# Patient Record
Sex: FEMALE | Race: WHITE | NOT HISPANIC OR LATINO | Employment: OTHER | URBAN - NONMETROPOLITAN AREA
[De-identification: names, ages, dates, MRNs, and addresses within clinical notes are randomized per-mention and may not be internally consistent; named-entity substitution may affect disease eponyms.]

---

## 2019-01-18 ENCOUNTER — OCCUPATIONAL MEDICINE (OUTPATIENT)
Dept: URGENT CARE | Facility: PHYSICIAN GROUP | Age: 54
End: 2019-01-18
Payer: COMMERCIAL

## 2019-01-18 VITALS
SYSTOLIC BLOOD PRESSURE: 140 MMHG | RESPIRATION RATE: 14 BRPM | BODY MASS INDEX: 25.46 KG/M2 | WEIGHT: 168 LBS | HEIGHT: 68 IN | OXYGEN SATURATION: 97 % | DIASTOLIC BLOOD PRESSURE: 98 MMHG | TEMPERATURE: 99.2 F | HEART RATE: 80 BPM

## 2019-01-18 DIAGNOSIS — M75.42 IMPINGEMENT SYNDROME OF LEFT SHOULDER: ICD-10-CM

## 2019-01-18 DIAGNOSIS — S46.812A STRAIN OF LEFT TRAPEZIUS MUSCLE, INITIAL ENCOUNTER: ICD-10-CM

## 2019-01-18 PROCEDURE — 99214 OFFICE O/P EST MOD 30 MIN: CPT | Performed by: PHYSICIAN ASSISTANT

## 2019-01-18 RX ORDER — PREDNISONE 10 MG/1
TABLET ORAL
Qty: 30 TAB | Refills: 0 | Status: SHIPPED | OUTPATIENT
Start: 2019-01-18 | End: 2019-01-28

## 2019-01-18 RX ORDER — CYCLOBENZAPRINE HCL 10 MG
10 TABLET ORAL 3 TIMES DAILY PRN
Qty: 30 TAB | Refills: 0 | Status: SHIPPED | OUTPATIENT
Start: 2019-01-18 | End: 2019-02-01 | Stop reason: SDUPTHER

## 2019-01-18 NOTE — PROGRESS NOTES
Chief Complaint   Patient presents with   • Shoulder Injury       HISTORY OF PRESENT ILLNESS: Patient is a 53 y.o. female who presents today because she is here for work comp injury.    Date of injury 1/15/2019 at approximately 10 AM.    She is a , a large husky dog jumped and fell on her left shoulder with her hand caught on a bathtub creating a downward pulling sensation to her left shoulder area.  Ever since then she has had intermittent numbness and tingling in her left hand, some mottling of her left hand.  She has been using ibuprofen and a TENS unit without improvement.  She has pain in her trapezius, the left side of her neck and her shoulder blade area.    She does have a history of crushing injury to that arm approximately 12 years ago from a different workers comp incident, but was not having symptoms at the time of this injury.    Patient Active Problem List    Diagnosis Date Noted   • Small bowel obstruction ([facility]) 05/01/2010   • Acute renal failure (ARF) ([facility]) 04/29/2010   • CAP (community acquired pneumonia) 04/12/2010   • Ventilator dependent ([facility]) 04/12/2010   • Renal mass, left 04/07/2010   • Respiratory failure, acute ([facility]) 04/04/2010   • Pleural effusion 04/04/2010       Allergies:Cortisone; Neomycin-polymyxin b; Pcn [penicillins]; and Triple antibiotic [neomycin-bacitracin-polymyxin]    Current Outpatient Prescriptions Or[facility] in [facility]   Medication Sig Dispense Refill   • cyclobenzaprine (FLEXERIL) 10 MG Tab Take 1 Tab by mouth 3 times a day as needed. 30 Tab 0   • predniSONE (DELTASONE) 10 MG Tab 4 PO QD X 4 day, 3 PO QD x 3 days, 2 PO QD x 2 days, 1 PO QD x 1 day 30 Tab 0   • hydrocodone-acetaminophen (NORCO) 5-325 MG Tab per tablet Take 1-2 Tabs by mouth every 6 hours as needed. 12 Tab 0   • cyclobenzaprine (FLEXERIL) 10 MG Tab Take 1 Tab by mouth 3 times a day as needed. 15 Tab 0   • ibuprofen (MOTRIN) 800 MG Tab Take 1 Tab by mouth every 8 hours as needed. 30 Tab 3     No current  "Epic-ordered facility-administered medications on file.        Past Medical History:   Diagnosis Date   • Arthritis     hands   • Backpain    • Bronchitis    • Indigestion     GERD   • Pneumonia        Social History   Substance Use Topics   • Smoking status: Former Smoker   • Smokeless tobacco: Never Used      Comment: 20yr pk hx   • Alcohol use No       No family status information on file.   No family history on file.    ROS:  Review of Systems   Constitutional: Negative for fever, chills, weight loss and malaise/fatigue.   HENT: Negative for ear pain, nosebleeds, congestion, sore throat and neck pain.    Eyes: Negative for blurred vision.   Respiratory: Negative for cough, sputum production, shortness of breath and wheezing.    Cardiovascular: Negative for chest pain, palpitations, orthopnea and leg swelling.   Gastrointestinal: Negative for heartburn, nausea, vomiting and abdominal pain.   Genitourinary: Negative for dysuria, urgency and frequency.     Exam:  Blood pressure 140/98, pulse 80, temperature 37.3 °C (99.2 °F), temperature source Temporal, resp. rate 14, height 1.727 m (5' 8\"), weight 76.2 kg (168 lb), SpO2 97 %.  General:  Well nourished, well developed female in NAD  Head:Normocephalic, atraumatic  Eyes: PERRLA, EOM within normal limits, no conjunctival injection, no scleral icterus, visual fields and acuity grossly intact.  Extremities: no clubbing, cyanosis, or edema.  Musculoskeletal: Left arm is without any visual deformity, erythema, edema or ecchymosis.  She has good distal circulation and sensation at this time, good distal  strength.  She has tenderness to palpation of the trapezius, the parascapular muscles and the lateral aspect of the shoulder joint.  She has reduced range of motion in all planes secondary to pain.    Please note that this dictation was created using voice recognition software. I have made every reasonable attempt to correct obvious errors, but I expect that there " are errors of grammar and possibly content that I did not discover before finalizing the note.    Assessment/Plan:  1. Impingement syndrome of left shoulder  predniSONE (DELTASONE) 10 MG Tab   2. Strain of left trapezius muscle, initial encounter  cyclobenzaprine (FLEXERIL) 10 MG Tab       Patient has failed over-the-counter anti-inflammatories and TENS unit.  Having nerve impingement symptoms, medications as above, lifting restrictions of 10 pounds and follow-up in 1 week

## 2019-01-18 NOTE — LETTER
"EMPLOYEE’S CLAIM FOR COMPENSATION/ REPORT OF INITIAL TREATMENT  FORM C-4    EMPLOYEE’S CLAIM - PROVIDE ALL INFORMATION REQUESTED   First Name  Leila Last Name  Berto Birthdate                    1965                Sex  female Claim Number   Home Address  [address] Age  53 y.o. Height  1.727 m (5' 8\") Weight  76.2 kg (168 lb) [facility]     [facility] [place]  [address] Telephone  [phone] (home)    Mailing Address  [address] [facility] [place]  [address] Primary Language Spoken  English    Insurer   Third Party   Miscellaneous   Employee's Occupation (Job Title) When Injury or Occupational Disease Occurred      Employer's Name    [employer]   [phone]   Employer Address   [address] 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 (Be specific an answer in detail. Use additional sheet if necessary)  Drying big dog, dog jumped onto my shoulder trapped my arm between tub and dog.   If you believe that you have an occupational disease, when did you first have knowledge of the disability and it relationship to your employment?  NA Witnesses to the Accident  Richelle Paulino      Nature of Injury or Occupational Disease  Workers' Compensation  Part(s) of Body Injured or Affected  Shoulder (L), ,     I certify that the above is true and correct to the best of my knowledge and that I have provided this information in order to obtain the benefits of [place]’s " [place]  I hereby authorize any physician, chiropractor, surgeon, practitioner, or other person, any hospital, including [facility] or [facility], any medical service organization, any insurance company, or other institution or organization to release to each other, any medical or other information, including benefits paid or payable, pertinent to this injury or disease, except information relative to diagnosis, treatment and/or counseling for AIDS, psychological conditions, alcohol or controlled substances, for which I must give specific authorization.  A Photostat of this authorization shall be as valid as the original.     Date 01/18/2019   [facility]   Employee’s Signature   THIS REPORT MUST BE COMPLETED AND MAILED WITHIN 3 WORKING DAYS OF TREATMENT   [facility]  Name of [facility]   Date  1/18/2019 Diagnosis  (M75.42) Impingement syndrome of left shoulder  (S46.812A) Strain of left trapezius muscle, initial encounter Is there evidence the injured employee was under the influence of alcohol and/or another controlled substance at the time of accident?   Hour  2:39 PM Description of Injury or Disease  Diagnoses of Impingement syndrome of left shoulder and Strain of left trapezius muscle, initial encounter were pertinent to this visit. No   Treatment  Steroid, muscle relaxant, apply heat, lifting restrictions  Have you advised the patient to remain off work five days or more? No   X-Ray Findings      If Yes   From Date  To Date      From information given by the employee, together with medical evidence, can you directly connect this injury or occupational disease as job incurred?  Yes If No Full Duty  No Modified Duty  Yes   Is additional medical care by a physician indicated?  Yes  Comments:Follow-up in 1 week If Modified Duty, Specify any Limitations /  "Restrictions  No lifting more than 10 pounds with left arm   Do you know of any previous injury or disease contributing to this condition or occupational disease?                            Yes  Comments:Previous injury 12 years ago, had resolution of symptoms prior to this   Date  1/18/2019 Print Doctor’s Name Cami Kaiser P.A.-C. I certify the employer’s copy of  this form was mailed on:   Address  [address] Insurer’s Use Only     [place]  [address]    Provider’s Tax ID Number  [id number] Telephone  Dept: [phone]        e-SignSTACAMI MEJIA P.A.-C.   e-Signature: Dr. Kaveh Ji, Medical Director Degree           ORIGINAL-TREATING PHYSICIAN OR CHIROPRACTOR    PAGE 2-INSURER/TPA    PAGE 3-EMPLOYER    PAGE 4-EMPLOYEE             Form C-4 (rev10/07)              BRIEF DESCRIPTION OF RIGHTS AND BENEFITS  (Pursuant to [place]RS 616C.050)    Notice of Injury or Occupational Disease (Incident Report Form C-1): If an injury or occupational disease (OD) arises out of and in the  course of employment, you must provide written notice to your employer as soon as practicable, but no later than 7 days after the accident or  OD. Your employer shall maintain a sufficient supply of the required forms.    Claim for Compensation (Form C-4): If medical treatment is sought, the form C-4 is available at the place of initial treatment. A completed  \"Claim for Compensation\" (Form C-4) must be filed within 90 days after an accident or OD. The treating physician or chiropractor must,  within 3 working days after treatment, complete and mail to the employer, the employer's insurer and third-party , the Claim for  Compensation.    Medical Treatment: If you require medical treatment for your on-the-job injury or OD, you may be required to select a physician or  chiropractor from a list provided by your workers’ compensation insurer, if it has contracted with an Organization for Managed Care " (MCO) or  Preferred Provider Organization (PPO) or providers of health care. If your employer has not entered into a contract with an MCO or PPO, you  may select a physician or chiropractor from the Panel of Physicians and Chiropractors. Any medical costs related to your industrial injury or  OD will be paid by your insurer.    Temporary Total Disability (TTD): If your doctor has certified that you are unable to work for a period of at least 5 consecutive days, or 5  cumulative days in a 20-day period, or places restrictions on you that your employer does not accommodate, you may be entitled to TTD  compensation.    Temporary Partial Disability (TPD): If the wage you receive upon reemployment is less than the compensation for TTD to which you are  entitled, the insurer may be required to pay you TPD compensation to make up the difference. TPD can only be paid for a maximum of 24  months.    Permanent Partial Disability (PPD): When your medical condition is stable and there is an indication of a PPD as a result of your injury or  OD, within 30 days, your insurer must arrange for an evaluation by a rating physician or chiropractor to determine the degree of your PPD. The  amount of your PPD award depends on the date of injury, the results of the PPD evaluation and your age and wage.    Permanent Total Disability (PTD): If you are medically certified by a treating physician or chiropractor as permanently and totally disabled  and have been granted a PTD status by your insurer, you are entitled to receive monthly benefits not to exceed 66 2/3% of your average  monthly wage. The amount of your PTD payments is subject to reduction if you previously received a PPD award.    Vocational Rehabilitation Services: You may be eligible for vocational rehabilitation services if you are unable to return to the job due to a  permanent physical impairment or permanent restrictions as a result of your injury or occupational  disease.    Transportation and Per Tal Reimbursement: You may be eligible for travel expenses and per tal associated with medical treatment.    Reopening: You may be able to reopen your claim if your condition worsens after claim closure.    Appeal Process: If you disagree with a written determination issued by the insurer or the insurer does not respond to your request, you may  appeal to the Department of Administration, , by following the instructions contained in your determination letter. You must  appeal the determination within 70 days from the date of the determination letter at [address], [address], [place], [place]  [address], or [address], [address], [place], [place] [address]. If you disagree with the  decision, you may appeal to the  Department of Administration, . You must file your appeal within 30 days from the date of the  decision  letter at [address], [address], [place], [place] [address], or [address], [place], [place] [address]. If you  disagree with a decision of an , you may file a petition for judicial review with the District Court. You must do so within 30  days of the Appeal Officer’s decision. You may be represented by an  at your own expense or you may contact the [facility] for possible  representation.    [place]  for Injured Workers (NAIW): If you disagree with a  decision, you may request that NAIW represent you  without charge at an  Hearing. For information regarding denial of benefits, you may contact the [facility] at: [address], [address], [place] [address], [phone], or [address] [address], [phone]    To File a Complaint with the Division: If you wish to file a complaint with the  of the Division of Industrial Relations (DIR),  please contact  the Workers’ Compensation Section, [address], [address], [place], [place] [address], telephone [phone]) [phone], or  [address], [address], [place], [place] [address], telephone [phone].    For assistance with Workers’ Compensation Issues: you may contact the Office of the [facility] Consumer Health Assistance, [address] [place] [address], [address], [place], [place] [address], Toll Free 1-981.933.4122, Web site: http://[place].[facility].[email or web address]., E-mail  [email or web address].                                                                                                                                                                                                                                   __________________________________________________________________                                                                   __01/18/2019________                Employee Name / Signature                                                                                                                                                       Date                                                                                                                                                                                                     D-2 (rev. 10/07)

## 2019-01-18 NOTE — LETTER
[facility]  [facility] [place] [address]  Phone:  [phone] - Fax:  [phone]   [facility] Progress Report and Disability Certification  Date of Service: 1/18/2019   No Show:  No  Date / Time of Next Visit: 1/25/2019   Claim Information   Patient Name: Leila Thomson  Claim Number:     Employer:   [employer] Date of Injury: 1/15/2019     Insurer / TPA: Miscellaneous  ID / SSN:     Occupation:   Diagnosis: Diagnoses of Impingement syndrome of left shoulder and Strain of left trapezius muscle, initial encounter were pertinent to this visit.    Medical Information   Related to Industrial Injury? Yes    Subjective Complaints:  Left shoulder pain, left trapezius pain, left arm numbness and tingling and radiating pain after injury at work 3 days ago   Objective Findings: Musculoskeletal: Left arm is without any visual deformity, erythema, edema or ecchymosis.  She has good distal circulation and sensation at this time, good distal  strength.  She has tenderness to palpation of the trapezius, the parascapular muscles and the lateral aspect of the shoulder joint.  She has reduced range of motion in all planes secondary to pain.   Pre-Existing Condition(s):     Assessment:   Initial Visit    Status: Additional Care Required  Comments:Follow-up in 1 week  Permanent Disability:No    Plan: MedicationMedication (NOT at Work)  Comments:Do not take Flexeril during working hours    Diagnostics:      Comments:       Disability Information   Status: Released to Restricted Duty    From:  1/18/2019  Through: 1/25/2019 Restrictions are: Temporary   Physical Restrictions   Sitting:    Standing:    Stooping:    Bending:      Squatting:    Walking:    Climbing:    Pushing:    Comments:No more than 10 pounds of left arm   Pulling:    Comments:No more than 10 pounds with left arm Other:    Reaching Above Shoulder (L): 0 hrs/day Reaching Above Shoulder (R):          Reaching Below Shoulder (L):  0 hrs/day Reaching Below Shoulder (R):      Not to exceed Weight Limits   Carrying(hrs):   Weight Limit(lb): < or = to 10 pounds  Comments:With left arm Lifting(hrs):   Weight  Limit(lb): < or = to 10 pounds  Comments:With left arm   Comments:      Repetitive Actions   Hands: i.e. Fine Manipulations from Grasping:     Feet: i.e. Operating Foot Controls:     Driving / Operate Machinery:     Physician Name: Cami Kaiser P.A.-C. Physician Signature: CAMI Izquierdo P.A.-C. e-Signature: Dr. Kaveh Ji, Medical Director   Clinic Name / Location: [address] [address] Clinic Phone Number: Dept: [phone]   Appointment Time: 2:30 Pm Visit Start Time: 2:39 PM   Check-In Time:  2:36 Pm Visit Discharge Time:  3:19 PM   Original-Treating Physician or Chiropractor    Page 2-Insurer/TPA    Page 3-Employer    Page 4-Employee

## 2019-01-25 ENCOUNTER — OCCUPATIONAL MEDICINE (OUTPATIENT)
Dept: URGENT CARE | Facility: PHYSICIAN GROUP | Age: 54
End: 2019-01-25
Payer: COMMERCIAL

## 2019-01-25 VITALS
SYSTOLIC BLOOD PRESSURE: 132 MMHG | TEMPERATURE: 99.4 F | HEART RATE: 80 BPM | RESPIRATION RATE: 14 BRPM | DIASTOLIC BLOOD PRESSURE: 94 MMHG | WEIGHT: 168 LBS | HEIGHT: 68 IN | BODY MASS INDEX: 25.46 KG/M2 | OXYGEN SATURATION: 97 %

## 2019-01-25 DIAGNOSIS — M75.42 IMPINGEMENT SYNDROME OF LEFT SHOULDER: ICD-10-CM

## 2019-01-25 DIAGNOSIS — S46.812D STRAIN OF LEFT TRAPEZIUS MUSCLE, SUBSEQUENT ENCOUNTER: ICD-10-CM

## 2019-01-25 PROCEDURE — 99213 OFFICE O/P EST LOW 20 MIN: CPT | Performed by: NURSE PRACTITIONER

## 2019-01-25 NOTE — LETTER
[facility]  [facility] [place] [address]  Phone:  [phone] - Fax:  [phone]   [facility] Progress Report and Disability Certification  Date of Service: 1/25/2019   No Show:  No  Date / Time of Next Visit: 2/1/2019   Claim Information   Patient Name: Leila Paulino  Claim Number:     Employer:   [employer] Date of Injury: 1/15/2019     Insurer / TPA:   Miscellaneous  ID / SSN:     Occupation:   Diagnosis: Diagnoses of Impingement syndrome of left shoulder and Strain of left trapezius muscle, subsequent encounter were pertinent to this visit.    Medical Information   Related to Industrial Injury? Yes    Subjective Complaints:  Date of injury 1/15/2019:     She is a , a large husky dog jumped and fell on her left shoulder with her hand caught on a bathtub creating a downward pulling sensation to her left shoulder area.  Ever since then she has had intermittent numbness and tingling in her left hand. She has pain in her trapezius, the left side of her neck and her shoulder blade area. She has been using ibuprofen during day and flexeril at night with about 30% improvement in pain since DOI. Admits work restrictions are helpful.      She does have a history of crushing injury to that arm approximately 12 years ago from a different workers comp incident, but was not having symptoms at the time of this injury.   Objective Findings: Musculoskeletal, left upper arm: no visual deformity, erythema, edema or ecchymosis.  Distal CMS and sensation intact at this time, good distal  strength.  There is still tenderness to palpation of the trapezius, the parascapular muscles and the lateral aspect of the shoulder joint.  Reports improved ROM since last visit.    Pre-Existing Condition(s):     Assessment:   Condition Improved    Status: Additional Care Required  Permanent Disability:No    Plan:      Diagnostics:      Comments:  Continue  current restrictions  Alternate ibuprofen and tylenol during day for pain  Flexeril at night  FV in one week    Disability Information   Status: Released to Restricted Duty    From:  2019  Through: 2019 Restrictions are:     Physical Restrictions   Sitting:    Standing:    Stooping:    Bending:      Squatting:    Walking:    Climbing:    Pushin hrs/day   Pullin hrs/day Other:    Reaching Above Shoulder (L): 0 hrs/day Reaching Above Shoulder (R):       Reaching Below Shoulder (L):  0 hrs/day Reaching Below Shoulder (R):      Not to exceed Weight Limits   Carrying(hrs):   Weight Limit(lb): < or = to 10 pounds  Comments:left arm Lifting(hrs):   Weight  Limit(lb): < or = to 10 pounds  Comments:left arm   Comments:      Repetitive Actions   Hands: i.e. Fine Manipulations from Grasping:     Feet: i.e. Operating Foot Controls:     Driving / Operate Machinery:     Physician Name: ALONDRA Dennis Physician Signature: LINUS Dewitt e-Signature: Dr. Kaveh Ji, Medical Director   Clinic Name / Location: [address] [address] Clinic Phone Number: Dept: [phone]   Appointment Time: 3:00 Pm Visit Start Time: 2:59 PM   Check-In Time:  2:58 Pm Visit Discharge Time:  3:23pm   Original-Treating Physician or Chiropractor    Page 2-Insurer/TPA    Page 3-Employer    Page 4-Employee

## 2019-01-25 NOTE — PROGRESS NOTES
"Subjective:      Leila Paulino is a 53 y.o. female who presents with Follow-Up      Date of injury 1/15/2019:     She is a , a large husky dog jumped and fell on her left shoulder with her hand caught on a bathtub creating a downward pulling sensation to her left shoulder area.  Ever since then she has had intermittent numbness and tingling in her left hand. She has pain in her trapezius, the left side of her neck and her shoulder blade area. She has been using ibuprofen during day and flexeril at night with about 30% improvement in pain since DOI. Admits work restrictions are helpful.      She does have a history of crushing injury to that arm approximately 12 years ago from a different workers comp incident, but was not having symptoms at the time of this injury.     HPI    Review of Systems   Musculoskeletal: Positive for joint pain (left shoulder).   All other systems reviewed and are negative.    Past Medical History:   Diagnosis Date   • Arthritis     hands   • Backpain    • Bronchitis    • Indigestion     GERD   • Pneumonia       Past Surgical History:   Procedure Laterality Date   • OTHER ABDOMINAL SURGERY        Social History     Social History   • Marital status:      Spouse name: N/A   • Number of children: N/A   • Years of education: N/A     Occupational History   • Not on file.     Social History Main Topics   • Smoking status: Former Smoker   • Smokeless tobacco: Never Used      Comment: 20yr pk hx   • Alcohol use No   • Drug use: No   • Sexual activity: Not on file     Other Topics Concern   • Not on file     Social History Narrative   • No narrative on file          Objective:     /94 (BP Location: Right arm, Patient Position: Sitting, BP Cuff Size: Adult)   Pulse 80   Temp 37.4 °C (99.4 °F) (Temporal)   Resp 14   Ht 1.727 m (5' 8\")   Wt 76.2 kg (168 lb)   SpO2 97%   BMI 25.54 kg/m²      Physical Exam   Constitutional: She is oriented to person, place, and time. Vital " signs are normal. She appears well-developed and well-nourished.   HENT:   Head: Normocephalic and atraumatic.   Eyes: Pupils are equal, round, and reactive to light. EOM are normal.   Neck: Normal range of motion.   Cardiovascular: Normal rate and regular rhythm.    Pulmonary/Chest: Effort normal.   Musculoskeletal: Normal range of motion.   See D-39 exam notes   Neurological: She is alert and oriented to person, place, and time.   Skin: Skin is warm and dry. Capillary refill takes less than 2 seconds.   Psychiatric: She has a normal mood and affect. Her speech is normal and behavior is normal. Thought content normal.   Vitals reviewed.      Musculoskeletal, left upper arm: no visual deformity, erythema, edema or ecchymosis.  Distal CMS and sensation intact at this time, good distal  strength.  There is still tenderness to palpation of the trapezius, the parascapular muscles and the lateral aspect of the shoulder joint.  Reports improved ROM since last visit.        Assessment/Plan:     1. Impingement syndrome of left shoulder    2. Strain of left trapezius muscle, subsequent encounter  Continue current restrictions  Alternate ibuprofen and tylenol during day for pain  Flexeril at night  FV in one week

## 2019-02-01 ENCOUNTER — OCCUPATIONAL MEDICINE (OUTPATIENT)
Dept: URGENT CARE | Facility: PHYSICIAN GROUP | Age: 54
End: 2019-02-01
Payer: COMMERCIAL

## 2019-02-01 VITALS
BODY MASS INDEX: 25.46 KG/M2 | SYSTOLIC BLOOD PRESSURE: 132 MMHG | TEMPERATURE: 98.7 F | WEIGHT: 168 LBS | DIASTOLIC BLOOD PRESSURE: 86 MMHG | HEART RATE: 80 BPM | HEIGHT: 68 IN | OXYGEN SATURATION: 99 % | RESPIRATION RATE: 14 BRPM

## 2019-02-01 DIAGNOSIS — M75.42 IMPINGEMENT SYNDROME OF LEFT SHOULDER: ICD-10-CM

## 2019-02-01 DIAGNOSIS — S46.812D STRAIN OF LEFT TRAPEZIUS MUSCLE, SUBSEQUENT ENCOUNTER: ICD-10-CM

## 2019-02-01 DIAGNOSIS — S46.812A STRAIN OF LEFT TRAPEZIUS MUSCLE, INITIAL ENCOUNTER: ICD-10-CM

## 2019-02-01 PROCEDURE — 99214 OFFICE O/P EST MOD 30 MIN: CPT | Performed by: PHYSICIAN ASSISTANT

## 2019-02-01 RX ORDER — CYCLOBENZAPRINE HCL 10 MG
10 TABLET ORAL 3 TIMES DAILY PRN
Qty: 30 TAB | Refills: 0 | Status: SHIPPED | OUTPATIENT
Start: 2019-02-01

## 2019-02-01 NOTE — LETTER
[facility]  [facility] [place] [address]  Phone:  [phone] - Fax:  [phone]   [facility] Progress Report and Disability Certification  Date of Service: 2/1/2019   No Show:  No  Date / Time of Next Visit: 2/8/2019   Claim Information   Patient Name: Leila Paulino  Claim Number:     Employer:   [employer] Date of Injury: 1/15/2019     Insurer / TPA:   misc ID / SSN:     Occupation:   Diagnosis: Diagnoses of Impingement syndrome of left shoulder, Strain of left trapezius muscle, subsequent encounter, and Strain of left trapezius muscle, initial encounter were pertinent to this visit.    Medical Information   Related to Industrial Injury? Yes    Subjective Complaints:  Improving left shoulder and trapezius pain from injury 2 weeks ago   Objective Findings: Musculoskeletal: She has tenderness to palpation of the left trapezius, no other tenderness to palpation, good distal range of motion, strength, circulation and sensation.   Pre-Existing Condition(s):     Assessment:   Condition Improved    Status: Additional Care Required  Comments:Follow-up in 1 week  Permanent Disability:No    Plan: MedicationMedication (NOT at Work)  Comments:Do not take muscle relaxants at work    Diagnostics:      Comments:       Disability Information   Status: Released to Restricted Duty    From:  2/1/2019  Through: 2/8/2019 Restrictions are: Temporary   Physical Restrictions   Sitting:    Standing:    Stooping:    Bending:      Squatting:    Walking:    Climbing:    Pushing:    Comments:No more than 10 pounds of left arm   Pulling:    Comments:No more than 10 pounds with left arm Other:    Reaching Above Shoulder (L): < or = to 2 hrs/day Reaching Above Shoulder (R):       Reaching Below Shoulder (L):  < or = to 2 hrs/day Reaching Below Shoulder (R):      Not to exceed Weight Limits   Carrying(hrs):   Weight Limit(lb): < or = to 10 pounds  Comments:With left arm  Lifting(hrs):   Weight  Limit(lb): < or = to 10 pounds  Comments:With left arm   Comments:      Repetitive Actions   Hands: i.e. Fine Manipulations from Grasping:     Feet: i.e. Operating Foot Controls:     Driving / Operate Machinery:     Physician Name: Cami Kaiser P.A.-C. Physician Signature: CAMI Izquierdo P.A.-C. e-Signature: Dr. Kaveh Ji, Medical Director   Clinic Name / Location: [address] [address] Clinic Phone Number: Dept: [phone]   Appointment Time: 3:30 Pm Visit Start Time: 3:26 PM   Check-In Time:  3:21 Pm Visit Discharge Time:  3:52 pm    Original-Treating Physician or Chiropractor    Page 2-Insurer/TPA    Page 3-Employer    Page 4-Employee

## 2019-02-01 NOTE — PROGRESS NOTES
Chief Complaint   Patient presents with   • Follow-Up       HISTORY OF PRESENT ILLNESS: Patient is a 53 y.o. female who presents today because she is here for work comp follow-up visit.    Date of injury 1/15/2019:     She is a , a large husky dog jumped and fell on her left shoulder with her hand caught on a bathtub creating a downward pulling sensation to her left shoulder area.  She had intermittent numbness and tingling in her left hand. She had pain in her trapezius, the left side of her neck and her shoulder blade area. She was  using ibuprofen during day and flexeril at night.  Since last visit 1 week ago, she has had improvement, reports about 50% back to normal, no distal paresthesias, only pain in her left trapezius area.  Reports improvement with Flexeril and anti-inflammatories     She does have a history of crushing injury to that arm approximately 12 years ago from a different workers comp incident, but was not having symptoms at the time of this injury.     Patient Active Problem List    Diagnosis Date Noted   • Small bowel obstruction ([facility]) 05/01/2010   • Acute renal failure (ARF) ([facility]) 04/29/2010   • CAP (community acquired pneumonia) 04/12/2010   • Ventilator dependent ([facility]) 04/12/2010   • Renal mass, left 04/07/2010   • Respiratory failure, acute ([facility]) 04/04/2010   • Pleural effusion 04/04/2010       Allergies:Cortisone; Neomycin-polymyxin b; Pcn [penicillins]; and Triple antibiotic [neomycin-bacitracin-polymyxin]    Current Outpatient Prescriptions Ordered in [facility]   Medication Sig Dispense Refill   • cyclobenzaprine (FLEXERIL) 10 MG Tab Take 1 Tab by mouth 3 times a day as needed. 30 Tab 0   • hydrocodone-acetaminophen (NORCO) 5-325 MG Tab per tablet Take 1-2 Tabs by mouth every 6 hours as needed. 12 Tab 0   • cyclobenzaprine (FLEXERIL) 10 MG Tab Take 1 Tab by mouth 3 times a day as needed. 15 Tab 0   • ibuprofen (MOTRIN) 800 MG Tab Take 1 Tab by mouth every 8 hours as needed. 30 Tab 3  "    No current [facility]-ordered facility-administered medications on file.        Past Medical History:   Diagnosis Date   • Arthritis     hands   • Backpain    • Bronchitis    • Indigestion     GERD   • Pneumonia        Social History   Substance Use Topics   • Smoking status: Former Smoker   • Smokeless tobacco: Never Used      Comment: 20yr pk hx   • Alcohol use No       No family status information on file.   No family history on file.    ROS:  Review of Systems   Constitutional: Negative for fever, chills, weight loss and malaise/fatigue.   HENT: Negative for ear pain, nosebleeds, congestion, sore throat and neck pain.    Eyes: Negative for blurred vision.   Respiratory: Negative for cough, sputum production, shortness of breath and wheezing.    Cardiovascular: Negative for chest pain, palpitations, orthopnea and leg swelling.   Gastrointestinal: Negative for heartburn, nausea, vomiting and abdominal pain.   Genitourinary: Negative for dysuria, urgency and frequency.     Exam:  Blood pressure 132/86, pulse 80, temperature 37.1 °C (98.7 °F), temperature source Temporal, resp. rate 14, height 1.727 m (5' 8\"), weight 76.2 kg (168 lb), SpO2 99 %.  General:  Well nourished, well developed female in NAD  Head:Normocephalic, atraumatic  Eyes: PERRLA, EOM within normal limits, no conjunctival injection, no scleral icterus, visual fields and acuity grossly intact.  Musculoskeletal: She has tenderness to palpation of the left trapezius, no other tenderness to palpation, good distal range of motion, strength, circulation and sensation.    Please note that this dictation was created using voice recognition software. I have made every reasonable attempt to correct obvious errors, but I expect that there are errors of grammar and possibly content that I did not discover before finalizing the note.    Assessment/Plan:  1. Impingement syndrome of left shoulder     2. Strain of left trapezius muscle, subsequent encounter   "     Follow-up in 1 week, will consider referral to physical therapy if not close to baseline.

## 2019-02-08 ENCOUNTER — OCCUPATIONAL MEDICINE (OUTPATIENT)
Dept: URGENT CARE | Facility: PHYSICIAN GROUP | Age: 54
End: 2019-02-08
Payer: COMMERCIAL

## 2019-02-08 VITALS
RESPIRATION RATE: 14 BRPM | HEIGHT: 68 IN | BODY MASS INDEX: 25.46 KG/M2 | WEIGHT: 168 LBS | TEMPERATURE: 98.9 F | OXYGEN SATURATION: 96 % | SYSTOLIC BLOOD PRESSURE: 136 MMHG | HEART RATE: 80 BPM | DIASTOLIC BLOOD PRESSURE: 88 MMHG

## 2019-02-08 DIAGNOSIS — S46.812A STRAIN OF LEFT TRAPEZIUS MUSCLE, INITIAL ENCOUNTER: ICD-10-CM

## 2019-02-08 DIAGNOSIS — M75.42 IMPINGEMENT SYNDROME OF LEFT SHOULDER: ICD-10-CM

## 2019-02-08 PROCEDURE — 99213 OFFICE O/P EST LOW 20 MIN: CPT | Performed by: PHYSICIAN ASSISTANT

## 2019-02-08 NOTE — LETTER
[facility]  [facility] [place] [address]  Phone:  [phone] - Fax:  [phone]   [facility] Progress Report and Disability Certification  Date of Service: 2/8/2019   No Show:  No  Date / Time of Next Visit:     Claim Information   Patient Name: Leila Paulino  Claim Number:     Employer:    Date of Injury: 1/15/2019     Insurer / TPA:    ID / SSN:     Occupation:   Diagnosis: Diagnoses of Impingement syndrome of left shoulder and Strain of left trapezius muscle, initial encounter were pertinent to this visit.    Medical Information   Related to Industrial Injury? Yes    Subjective Complaints:  Date of injury 1/15/2019:     Visit #4. She is a , a large husky dog jumped and fell on her left shoulder with her hand caught on a bathtub creating a downward pulling sensation to her left shoulder area.  She is no longer having numbness and tingling in her left hand. She reports resolved pain in the left trapezius, the left side of her neck and her shoulder blade area. She continues to use ibuprofen and flexeril as needed.  Since last visit 1 week ago, she has had improvement, reports about 95% back to normal, no distal paresthesias.  Reports improvement with Flexeril and anti-inflammatories     She does have a history of crushing injury to that arm approximately 12 years ago from a different workers comp incident, but was not having symptoms at the time of this injury.      Objective Findings: Physical Exam   Constitutional: She appears well-developed and well-nourished.   HENT:   Head: Normocephalic and atraumatic.   Neck: Neck supple.   Pulmonary/Chest: Effort normal. No respiratory distress.   Musculoskeletal:        Left shoulder: She exhibits normal range of motion, no tenderness, no bony tenderness, no swelling, no effusion, no crepitus, no deformity and no spasm.   Negative cross arm test   Neurological: She is alert.   Skin: Skin is  warm and dry.   Psychiatric: She has a normal mood and affect. Her behavior is normal. Thought content normal.   Vitals reviewed.     Pre-Existing Condition(s):     Assessment:   Condition Improved    Status: Discharged / Care Transfer  Permanent Disability:No    Plan:      Diagnostics:      Comments:       Disability Information   Status: Released to Full Duty    From:     Through:   Restrictions are:     Physical Restrictions   Sitting:    Standing:    Stooping:    Bending:      Squatting:    Walking:    Climbing:    Pushing:      Pulling:    Other:    Reaching Above Shoulder (L):   Reaching Above Shoulder (R):       Reaching Below Shoulder (L):    Reaching Below Shoulder (R):      Not to exceed Weight Limits   Carrying(hrs):   Weight Limit(lb):   Lifting(hrs):   Weight  Limit(lb):     Comments:      Repetitive Actions   Hands: i.e. Fine Manipulations from Grasping:     Feet: i.e. Operating Foot Controls:     Driving / Operate Machinery:     Physician Name: Ameena Saldana P.A.-C. Physician Signature: AMEENA Rios P.A.-C. e-Signature: Dr. Kaveh Ji, Medical Director   Clinic Name / Location: [address] [address] Clinic Phone Number: Dept: [phone]   Appointment Time: 3:30 Pm Visit Start Time: 3:50 PM   Check-In Time:  3:32 Pm Visit Discharge Time:    Original-Treating Physician or Chiropractor    Page 2-Insurer/TPA    Page 3-Employer    Page 4-Employee

## 2019-02-09 ASSESSMENT — ENCOUNTER SYMPTOMS
NECK PAIN: 0
MYALGIAS: 0

## 2019-02-09 NOTE — PROGRESS NOTES
Subjective:   Leila Paulino is a 53 y.o. female who presents for Follow-Up        Date of injury 1/15/2019:     Visit #4. She is a , a large husky dog jumped and fell on her left shoulder with her hand caught on a bathtub creating a downward pulling sensation to her left shoulder area.  She is no longer having numbness and tingling in her left hand. She reports resolved pain in the left trapezius, the left side of her neck and her shoulder blade area. She continues to use ibuprofen and flexeril as needed.  Since last visit 1 week ago, she has had improvement, reports about 95% back to normal, no distal paresthesias.  Reports improvement with Flexeril and anti-inflammatories     She does have a history of crushing injury to that arm approximately 12 years ago from a different workers comp incident, but was not having symptoms at the time of this injury.       Review of Systems   Musculoskeletal: Negative for joint pain, myalgias and neck pain.       PMH:  has a past medical history of Arthritis; Backpain; Bronchitis; Indigestion; and Pneumonia.  MEDS:   Current Outpatient Prescriptions:   •  cyclobenzaprine (FLEXERIL) 10 MG Tab, Take 1 Tab by mouth 3 times a day as needed., Disp: 30 Tab, Rfl: 0  •  hydrocodone-acetaminophen (NORCO) 5-325 MG Tab per tablet, Take 1-2 Tabs by mouth every 6 hours as needed., Disp: 12 Tab, Rfl: 0  •  cyclobenzaprine (FLEXERIL) 10 MG Tab, Take 1 Tab by mouth 3 times a day as needed., Disp: 15 Tab, Rfl: 0  •  ibuprofen (MOTRIN) 800 MG Tab, Take 1 Tab by mouth every 8 hours as needed., Disp: 30 Tab, Rfl: 3  ALLERGIES:   Allergies   Allergen Reactions   • Cortisone Vomiting   • Neomycin-Polymyxin B Rash   • Pcn [Penicillins] Rash   • Triple Antibiotic [Neomycin-Bacitracin-Polymyxin] Rash     SURGHX:   Past Surgical History:   Procedure Laterality Date   • OTHER ABDOMINAL SURGERY       SOCHX:  reports that she has quit smoking. She has never used smokeless tobacco. She reports that  "she does not drink alcohol or use drugs.  FH: Family history was reviewed, no pertinent findings to report   Objective:   /88 (BP Location: Right arm, Patient Position: Sitting, BP Cuff Size: Adult)   Pulse 80   Temp 37.2 °C (98.9 °F) (Temporal)   Resp 14   Ht 1.727 m (5' 8\")   Wt 76.2 kg (168 lb)   SpO2 96%   BMI 25.54 kg/m²   Physical Exam   Constitutional: She appears well-developed and well-nourished.   HENT:   Head: Normocephalic and atraumatic.   Neck: Neck supple.   Pulmonary/Chest: Effort normal. No respiratory distress.   Musculoskeletal:        Left shoulder: She exhibits normal range of motion, no tenderness, no bony tenderness, no swelling, no effusion, no crepitus, no deformity and no spasm.   Negative cross arm test   Neurological: She is alert.   Skin: Skin is warm and dry.   Psychiatric: She has a normal mood and affect. Her behavior is normal. Thought content normal.   Vitals reviewed.        Assessment/Plan:   1. Impingement syndrome of left shoulder    2. Strain of left trapezius muscle, initial encounter    No abnormalities on PE and pt states she is 100% improved.  She is cleared for full duty and released from W/C.    Differential diagnosis, natural history, supportive care, and indications for immediate follow-up discussed.  "